# Patient Record
Sex: MALE | Race: WHITE | NOT HISPANIC OR LATINO | Employment: FULL TIME | URBAN - NONMETROPOLITAN AREA
[De-identification: names, ages, dates, MRNs, and addresses within clinical notes are randomized per-mention and may not be internally consistent; named-entity substitution may affect disease eponyms.]

---

## 2020-05-08 ENCOUNTER — OFFICE VISIT CONVERTED (OUTPATIENT)
Dept: FAMILY MEDICINE CLINIC | Age: 25
End: 2020-05-08
Attending: NURSE PRACTITIONER

## 2020-09-08 ENCOUNTER — OFFICE VISIT CONVERTED (OUTPATIENT)
Dept: FAMILY MEDICINE CLINIC | Age: 25
End: 2020-09-08
Attending: FAMILY MEDICINE

## 2021-01-19 ENCOUNTER — OFFICE VISIT CONVERTED (OUTPATIENT)
Dept: FAMILY MEDICINE CLINIC | Age: 26
End: 2021-01-19
Attending: NURSE PRACTITIONER

## 2021-01-20 ENCOUNTER — HOSPITAL ENCOUNTER (OUTPATIENT)
Dept: OTHER | Facility: HOSPITAL | Age: 26
Discharge: HOME | End: 2021-01-20
Attending: NURSE PRACTITIONER

## 2021-01-20 LAB
ALBUMIN SERPL-MCNC: 4.6 G/DL (ref 3.5–5)
ALBUMIN/GLOB SERPL: 1.3 {RATIO} (ref 1.4–2.6)
ALP SERPL-CCNC: 60 U/L (ref 53–128)
ALT SERPL-CCNC: 9 U/L (ref 10–40)
ANION GAP SERPL CALC-SCNC: 12 MMOL/L (ref 8–19)
AST SERPL-CCNC: 15 U/L (ref 15–50)
BASOPHILS # BLD MANUAL: 0.01 10*3/UL (ref 0–0.2)
BASOPHILS NFR BLD MANUAL: 0.2 % (ref 0–3)
BILIRUB SERPL-MCNC: 0.48 MG/DL (ref 0.2–1.3)
BUN SERPL-MCNC: 12 MG/DL (ref 5–25)
BUN/CREAT SERPL: 13 {RATIO} (ref 6–20)
CALCIUM SERPL-MCNC: 10.2 MG/DL (ref 8.7–10.4)
CHLORIDE SERPL-SCNC: 102 MMOL/L (ref 99–111)
CHOLEST SERPL-MCNC: 187 MG/DL (ref 107–200)
CHOLEST/HDLC SERPL: 3.5 {RATIO} (ref 3–6)
CONV CO2: 29 MMOL/L (ref 22–32)
CONV TOTAL PROTEIN: 8.1 G/DL (ref 6.3–8.2)
CREAT UR-MCNC: 0.92 MG/DL (ref 0.7–1.2)
DEPRECATED RDW RBC AUTO: 41.9 FL
EOSINOPHIL # BLD MANUAL: 0.09 10*3/UL (ref 0–0.7)
EOSINOPHIL NFR BLD MANUAL: 1.5 % (ref 0–7)
ERYTHROCYTE [DISTWIDTH] IN BLOOD BY AUTOMATED COUNT: 11.9 % (ref 11.5–14.5)
GFR SERPLBLD BASED ON 1.73 SQ M-ARVRAT: >60 ML/MIN/{1.73_M2}
GLOBULIN UR ELPH-MCNC: 3.5 G/DL (ref 2–3.5)
GLUCOSE SERPL-MCNC: 69 MG/DL (ref 70–99)
GRANS (ABSOLUTE): 4.02 10*3/UL (ref 2–8)
GRANS: 65.5 % (ref 30–85)
HBA1C MFR BLD: 15 G/DL (ref 14–18)
HCT VFR BLD AUTO: 43.9 % (ref 42–52)
HDLC SERPL-MCNC: 54 MG/DL (ref 40–60)
IMM GRANULOCYTES # BLD: 0.02 10*3/UL (ref 0–0.54)
IMM GRANULOCYTES NFR BLD: 0.3 % (ref 0–0.43)
LDLC SERPL CALC-MCNC: 114 MG/DL (ref 70–100)
LYMPHOCYTES # BLD MANUAL: 1.55 10*3/UL (ref 1–5)
LYMPHOCYTES NFR BLD MANUAL: 7.2 % (ref 3–10)
MCH RBC QN AUTO: 32.4 PG (ref 27–31)
MCHC RBC AUTO-ENTMCNC: 34.2 G/DL (ref 33–37)
MCV RBC AUTO: 94.8 FL (ref 80–96)
MONOCYTES # BLD AUTO: 0.44 10*3/UL (ref 0.2–1.2)
OSMOLALITY SERPL CALC.SUM OF ELEC: 284 MOSM/KG (ref 273–304)
PLATELET # BLD AUTO: 183 10*3/UL (ref 130–400)
PMV BLD AUTO: 10.1 FL (ref 7.4–10.4)
POTASSIUM SERPL-SCNC: 4.6 MMOL/L (ref 3.5–5.3)
RBC # BLD AUTO: 4.63 10*6/UL (ref 4.7–6.1)
SODIUM SERPL-SCNC: 138 MMOL/L (ref 135–147)
TRIGL SERPL-MCNC: 94 MG/DL (ref 40–150)
TSH SERPL-ACNC: 1.1 M[IU]/L (ref 0.27–4.2)
VARIANT LYMPHS NFR BLD MANUAL: 25.3 % (ref 20–45)
VLDLC SERPL-MCNC: 19 MG/DL (ref 5–37)
WBC # BLD AUTO: 6.13 10*3/UL (ref 4.8–10.8)

## 2021-05-18 NOTE — PROGRESS NOTES
Shaheen Raygoza  1995     Office/Outpatient Visit    Visit Date: Fri, May 8, 2020 10:59 am    Provider: Xochitl Staples N.P. (Assistant: Griselda Vargas MA)    Location: [facility]        Electronically signed by Xochitl Staples N.P. on  05/08/2020 01:17:40 PM                             Subjective:        CC: Salvador is a 25 year old White male.  New patient establishment, complaints of nausea and diarrhea X 2 weeks ([employer] VIDEO CALL )         HPI: 25 year old male presenting to clinic to establish care and with complaints of heartburn, nausea and diarrhea x 2 weeks. He states he started to take Nexium approx 2-3 weeks ago and it has helped somewhat. He wakes nauseated and has diarrhea. He usually has 4-5 loose stools which subside by mid afternoon. He states that it usually occurs after eating. No abd pain, just mild cramping. Pt does not eat before going to bed. Medical, surgical, family and social hx reviewed with pt.    ROS:     CONSTITUTIONAL:  Negative for chills, fatigue and fever.      CARDIOVASCULAR:  Negative for chest pain and palpitations.      RESPIRATORY:  Negative for recent cough and dyspnea.      GASTROINTESTINAL:  Negative for abdominal bloating, constipation, hematochezia, melena and vomiting.      GENITOURINARY:  Negative for dysuria.      MUSCULOSKELETAL:  Negative for myalgias.      INTEGUMENTARY:  Negative for pruritis and rash.      NEUROLOGICAL:  Negative for dizziness, fainting, headaches, paresthesias and weakness.      ENDOCRINE:  Negative for polydipsia and polyphagia.      PSYCHIATRIC:  Negative for anxiety and sleep disturbance.          Past Medical History / Family History / Social History:         Last Reviewed on 5/08/2020 01:09 PM by Xochitl Staples    Past Medical History:             PAST MEDICAL HISTORY         HEART MURMUR     Chicken pox     WEARS CONTACTS         Surgical History:         Tonsillectomy/Adenoidectomy; at age 4yr       broken femur - has joel;         Family History:     Father: ;  Myocardial Infarction ( AT AGE 35 )     Paternal Grandfather: Brain Tumor     Paternal Grandmother: Cancer (unspecified type)         Social History:         Household:  Lives with his mother.  Occupation: [employer] ([employer])     Marital Status: Single     Children: None         Tobacco/Alcohol/Supplements:     Last Reviewed on 2020 01:09 PM by Xochitl Staples    Tobacco: He has never smoked.          Alcohol: Frequency: Just on weekends         Substance Abuse History:     Last Reviewed on 2020 01:09 PM by Xochitl Staples    None         Mental Health History:     Last Reviewed on 2020 01:09 PM by Xochitl Staples    NEGATIVE         Immunizations:     DTaP 1994    DTaP 1994    DTaP 1994    DTaP 1995    DTaP 1998    Hib HbOC (4-dose) 1994    Hib HbOC (4-dose) 1994    Hib HbOC (4-dose) 1994    Hib HbOC (4-dose) 1995    Hep B (pedi/adol, 3-dose schedule) 1994    Hep B (pedi/adol, 3-dose schedule) 1994    Hep B (pedi/adol, 3-dose schedule) 1995    OPV  Poliovirus, live (oral) 1994    OPV  Poliovirus, live (oral) 8/15/1996    OPV  Poliovirus, live (oral) 1994    OPV  Poliovirus, live (oral) 1998    MMR  (Measles-Mumps-Rubella), live 1998    MMR  (Measles-Mumps-Rubella), live 1995    Varicella, live 1998    Prevnar (Pneumococcal PCV 7) 2000    FluMist 2006    FluMist 2008    FluMist 2009    FluMist 2010    FluMist 2011    Influenza A (H1N1) NASAL, Monovalent Live 11/3/2009    Influenza, live, intranasal 2007    Menactra (Meningococcal MCV4P) 2008    Tdap (Tetanus, reduced diph, acellular pertussis) 2005        Allergies:     Last Reviewed on 2020 01:09 PM by Xochitl Staples    Phenergan:          Current Medications:     Last Reviewed on 2020 01:09 PM by Xochitl Staples    None         Objective:        Exams:     PHYSICAL EXAM:     GENERAL: well developed, well nourished;  well groomed;  no apparent distress;     RESPIRATORY: normal respiratory rate and pattern with no distress;     MUSCULOSKELETAL: normal overall tone     NEUROLOGIC: mental status: alert and oriented x 3;     PSYCHIATRIC:  appropriate affect and demeanor; normal speech pattern; grossly normal memory;         Assessment:         K21.9   Gastro-esophageal reflux disease without esophagitis       R19.7   Diarrhea, unspecified           ORDERS:         Meds Prescribed:       [New Rx] pantoprazole 40 mg oral tablet, delayed release (enteric coated) [take 1 tablet (40 mg) by oral route daily], #30 (thirty) tablets, Refills: 1 (one)       [New Rx] sucralfate 1 gram oral tablet [take 1 tablet (1 gram) by oral route 4 times per day (30 - 60 minutes prior to meals and at bed time) ], #56 (fifty six) tablets, Refills: 0 (zero)       [New Rx] ondansetron 4 mg oral Tablet,disintegrating [place  1  tablets (4 mg) on top of the tongue where it will dissolve every 6 hours as needed for nausea], #30 (thirty) tablets, Refills: 0 (zero)                 Plan:         Gastro-esophageal reflux disease without esophagitisPt is to stop taking Nexium and start protonix. Zofran as needed for nausea. Lane diet for next 48 hours and advance as tolerated. Carafate as prescribed for 2 weeks. If symptoms worsen, notify office or if they are not started to subside within 1-2 weeks. At this point, labs and imaging may be needed. Pt v/u and had no further questions upon d/c,      Telehealth: Verbal consent obtained for visit to occur via televideo conferencing; Staff, other than provider, present during telephone visit include Griselda Vargas MA           Prescriptions:       [New Rx] pantoprazole 40 mg oral tablet, delayed release (enteric coated) [take 1 tablet (40 mg) by oral route daily], #30 (thirty) tablets, Refills: 1 (one)       [New Rx] ondansetron 4 mg  oral Tablet,disintegrating [place  1  tablets (4 mg) on top of the tongue where it will dissolve every 6 hours as needed for nausea], #30 (thirty) tablets, Refills: 0 (zero)       [New Rx] sucralfate 1 gram oral tablet [take 1 tablet (1 gram) by oral route 4 times per day (30 - 60 minutes prior to meals and at bed time) ], #56 (fifty six) tablets, Refills: 0 (zero)         Diarrhea, unspecifiedsee above. Hopefully decreasing acid will help with diarrhea. If not, will follow plan above.            Charge Capture:         Primary Diagnosis:     K21.9  Gastro-esophageal reflux disease without esophagitis           Orders:      [id number]  Office visit - new pt, level 3  (In-House)              R19.7  Diarrhea, unspecified

## 2021-05-18 NOTE — PROGRESS NOTES
Shaheen Raygoza  1995     Office/Outpatient Visit    Visit Date: Tue, Sep 8, 2020 03:00 pm    Provider: Marty Gupta MD (Assistant: Enedina Le, )    Location: [facility]        Electronically signed by Marty Gupta MD on  09/08/2020 05:13:31 PM                             Subjective:        CC: Salvador is a 25 year old male.  Physical         HPI:           Encounter for general adult medical examination without abnormal findings noted.  His last physical exam was 1 year ago.  He does not perform regular testicular self-exams.  He is not current with his Td and influenza immunization.      Smoking Status:  Nonsmoker     ROS:     CONSTITUTIONAL:  Negative for chills, fatigue, fever, and weight change.      EYES:  Negative for blurred vision.      E/N/T:  Negative for ear pain and tinnitus.      CARDIOVASCULAR:  Negative for chest pain, dizziness, palpitations and edema.      RESPIRATORY:  Negative for dyspnea and cough.      GASTROINTESTINAL:  Negative for abdominal pain, constipation, diarrhea, heartburn, nausea and vomiting.      GENITOURINARY:  Negative for dysuria, hematuria and polyuria.      MUSCULOSKELETAL:  Negative for arthralgias and myalgias.      INTEGUMENTARY:  Negative for rash.      NEUROLOGICAL:  Negative for headaches, paresthesias and weakness.      HEMATOLOGIC/LYMPHATIC:  Negative for easy bruising and excessive bleeding.      ENDOCRINE:  Negative for hair loss, heat/cold intolerance, polydipsia, and polyphagia.      ALLERGIC/IMMUNOLOGIC:  Positive for seasonal allergies.      PSYCHIATRIC:  Negative for anxiety, depression, and sleep disturbances.          Past Medical History / Family History / Social History:         Last Reviewed on 9/08/2020 05:13 PM by Marty Gupta    Past Medical History:             PAST MEDICAL HISTORY         HEART MURMUR     Chicken pox     WEARS CONTACTS         PAST MEDICAL HISTORY         Positive for    Gastroesophageal Reflux  Disease;         Surgical History:         Tonsillectomy/Adenoidectomy; at age 4yr     2008 broken femur - has joel;         Family History:     Father: ;  Myocardial Infarction ( AT AGE 35 )     Paternal Grandfather: Brain Tumor     Paternal Grandmother: Cancer (unspecified type)         Social History:         Household:  Lives with his mother.  Occupation: [employer] ([employer])     Marital Status: Single     Children: None         Tobacco/Alcohol/Supplements:     Last Reviewed on 2020 05:13 PM by Marty Gupta    Tobacco: He has never smoked.          Alcohol: Frequency: Just on weekends         Substance Abuse History:     Last Reviewed on 2020 05:13 PM by Marty Gupta    None         Mental Health History:     Last Reviewed on 2020 05:13 PM by Marty Gupta    NEGATIVE         Communicable Diseases (eg STDs):     Last Reviewed on 2020 05:13 PM by Marty Gupta        Current Problems:     Last Reviewed on 2020 05:13 PM by Marty Gupta    Gastro-esophageal reflux disease without esophagitis    Encounter for general adult medical examination without abnormal findings        Immunizations:     DTaP 1994    DTaP 1994    DTaP 1994    DTaP 1995    DTaP 1998    Hib HbOC (4-dose) 1994    Hib HbOC (4-dose) 1994    Hib HbOC (4-dose) 1994    Hib HbOC (4-dose) 1995    Hep B (pedi/adol, 3-dose schedule) 1994    Hep B (pedi/adol, 3-dose schedule) 1994    Hep B (pedi/adol, 3-dose schedule) 1995    OPV  Poliovirus, live (oral) 1994    OPV  Poliovirus, live (oral) 8/15/1996    OPV  Poliovirus, live (oral) 1994    OPV  Poliovirus, live (oral) 1998    MMR  (Measles-Mumps-Rubella), live 1998    MMR  (Measles-Mumps-Rubella), live 1995    Varicella, live 1998    Prevnar (Pneumococcal PCV 7) 2000    FluMist 2006    FluMist 2008    FluMist 2009    FluMist 2010    FluMist 2011    Influenza A  "(H1N1) NASAL, Monovalent Live 11/3/2009    Influenza, live, intranasal 11/7/2007    Menactra (Meningococcal MCV4P) 6/17/2008    Tdap (Tetanus, reduced diph, acellular pertussis) 6/22/2005        Allergies:     Last Reviewed on 9/08/2020 05:13 PM by Marty Gupta    Phenergan:          Current Medications:     Last Reviewed on 9/08/2020 05:13 PM by Marty Gupta    None        Objective:        Vitals:         Current: 9/8/2020 3:05:04 PM    Ht:  6 ft, 1.625 in;  Wt: 157.9 lbs;  BMI: 20.5T: 97.6 F (temporal);  BP: 133/68 mm Hg (left arm, sitting);  P: 78 bpm (left arm (BP Cuff), sitting)        Exams:     PHYSICAL EXAM:     GENERAL: Vitals recorded well developed, well nourished;  no apparent distress;     EYES: conjunctiva and cornea are normal;     NECK: trachea is midline; thyroid is non-palpable;     RESPIRATORY: Clear to auscultation bilateally; no rales (\"crackles\") present; no rhonchi; no wheezes;     CARDIOVASCULAR: normal rate; rhythm is regular;  No murmurs, clicks, gallops or rubs appreciated; no edema;     GASTROINTESTINAL: nontender; Soft and nondistended; normal bowel sounds; no organomegaly; no masses;     LYMPHATIC: no enlargement of cervical or facial nodes; no supraclavicular nodes;     SKIN:  No significant rashes, lesions or suspicious moles within limits of examination;     MUSCULOSKELETAL: muscle strength: 5/5 in all major muscle groups;  normal overall tone     NEUROLOGIC: mental status: alert and oriented x 3; Grossly intact;     PSYCHIATRIC: appropriate affect and demeanor; normal speech pattern; Normal behavior;         Assessment:         Z00.00   Encounter for general adult medical examination without abnormal findings           ORDERS:         Lab Orders:       [id number]  [facility] PHYSICAL: CMP, CBC, TSH, LIPID: [id number], [id number], [id number], [id number]  (Send-Out)                      Plan:         Encounter for general adult medical examination without abnormal findings- Appropriate screenings and " vaccinations were reviewed with the patient and offered as indicated.  Patient counseled on healthy lifestyle including balanced diet and adequate physical activity.  Screening labs ordered today including CBC, CMP, TSH and lipid panel.    LABORATORY:  Labs ordered to be performed today include PHYSICAL PANEL; CMP, CBC, TSH, LIPID.            Orders:       [id number]  [facility] PHYSICAL: CMP, CBC, TSH, LIPID: [id number], [id number], [id number], [id number]  (Send-Out)                  Charge Capture:         Primary Diagnosis:     Z00.00  Encounter for general adult medical examination without abnormal findings           Orders:      [id number]  Preventive medicine, established patient, age 18-39 years  (In-House)

## 2021-05-18 NOTE — PROGRESS NOTES
Shaheen Raygoza  1995     Office/Outpatient Visit    Visit Date:  03:25 pm    Provider: Xochitl Staples N.P. (Assistant: Elidia Guzman MA)    Location: [facility]        Electronically signed by Xochitl Staples N.P. on  2021 11:22:06 PM                             Subjective:        CC: [employer] [phone] is a 25 year old White male.  Light headed and dizziness at work the other day. Fam hx. of heart disease, would like a referral to cardio         HPI: 24 y/o male presenting to office via telehealth video c/o feeling light headed and dizzy at work late last week. He has had a couple of episodes since. Can happen at anytime, at rest or with activity. Pt has murmur and sees cardio regularly. Has an appt next month, but wanted to get checked out to see if he needed to be seen earlier.    ROS:     CONSTITUTIONAL:  Negative for chills, fatigue and fever.      EYES:  Negative for blurred vision.      CARDIOVASCULAR:  Positive for palpitations.   Negative for chest pain, dizziness, pedal edema or tachycardia.      RESPIRATORY:  Negative for recent cough and dyspnea.      GASTROINTESTINAL:  Negative for abdominal pain, diarrhea and nausea.      MUSCULOSKELETAL:  Negative for myalgias.      INTEGUMENTARY:  Negative for rash.      NEUROLOGICAL:  Negative for headaches, paresthesias and weakness.      PSYCHIATRIC:  Negative for anxiety, depression and sleep disturbance.          Past Medical History / Family History / Social History:         Last Reviewed on 2021 04:13 PM by Xochitl Staples    Past Medical History:             PAST MEDICAL HISTORY         HEART MURMUR     Chicken pox     WEARS CONTACTS         PAST MEDICAL HISTORY         Positive for    Gastroesophageal Reflux Disease;         Surgical History:         Tonsillectomy/Adenoidectomy; at age 4yr      broken femur - has joel;         Family History:     Father: ;   Myocardial Infarction ( AT AGE 35 )     Paternal Grandfather: Brain Tumor     Paternal Grandmother: Cancer (unspecified type)         Social History:         Household:  Lives with his mother.  Occupation: [employer] ([employer])     Marital Status: Single     Children: None         Tobacco/Alcohol/Supplements:     Last Reviewed on 1/19/2021 04:13 PM by Xochitl Staples    Tobacco: He has never smoked.          Alcohol: Frequency: Just on weekends         Substance Abuse History:     Last Reviewed on 1/19/2021 04:13 PM by Xochitl Staples    None         Mental Health History:     Last Reviewed on 1/19/2021 04:13 PM by Xochitl Staples    NEGATIVE         Communicable Diseases (eg STDs):     Last Reviewed on 1/19/2021 04:13 PM by Xochitl Staples        Immunizations:     DTaP 1994    DTaP 1994    DTaP 1994    DTaP 5/18/1995    DTaP 4/28/1998    Hib HbOC (4-dose) 1994    Hib HbOC (4-dose) 1994    Hib HbOC (4-dose) 1994    Hib HbOC (4-dose) 5/18/1995    Hep B (pedi/adol, 3-dose schedule) 1994    Hep B (pedi/adol, 3-dose schedule) 1994    Hep B (pedi/adol, 3-dose schedule) 2/17/1995    OPV  Poliovirus, live (oral) 1994    OPV  Poliovirus, live (oral) 8/15/1996    OPV  Poliovirus, live (oral) 1994    OPV  Poliovirus, live (oral) 4/28/1998    MMR  (Measles-Mumps-Rubella), live 4/28/1998    MMR  (Measles-Mumps-Rubella), live 5/18/1995    Varicella, live 4/28/1998    Prevnar (Pneumococcal PCV 7) 6/16/2000    FluMist 12/7/2006    FluMist 9/29/2008    FluMist 8/20/2009    FluMist 9/22/2010    FluMist 9/6/2011    Influenza A (H1N1) NASAL, Monovalent Live 11/3/2009    Influenza, live, intranasal 11/7/2007    Menactra (Meningococcal MCV4P) 6/17/2008    Tdap (Tetanus, reduced diph, acellular pertussis) 6/22/2005        Allergies:     Last Reviewed on 1/19/2021 04:13 PM by Xochitl Staples    Phenergan:          Current Medications:     Last Reviewed on 1/19/2021 04:13 PM by Bel  Xochitl MONSALVE    None        Objective:        Exams:     PHYSICAL EXAM:     GENERAL: well developed, well nourished;  well groomed;  no apparent distress;     RESPIRATORY: normal respiratory rate and pattern with no distress;     NEUROLOGIC: mental status: alert and oriented x 3;     PSYCHIATRIC:  appropriate affect and demeanor; normal speech pattern; grossly normal memory;         Assessment:         R42   Dizziness and giddiness           ORDERS:         Radiology/Test Orders:       [id number]  Electrocardiogram, routine with at least 12 leads; with interpretation and report  (Send-Out)              Lab Orders:       [id number]  [facility] PHYSICAL: CMP, CBC, TSH, LIPID: [id number], [id number]  (Send-Out)                      Plan:         Dizziness and giddinessPt to come in tomorrow to have ECG and check BP. Go to ED immediately with any chest pain or syncope/feeling faint or with an increase in dizziness.      LABORATORY:  Labs ordered to be performed today include PHYSICAL PANEL; CMP, CBC, TSH, LIPID.      TESTS/PROCEDURES:  Will proceed with an ECG to be performed/scheduled now.  Telehealth: Verbal consent obtained for visit to occur via televideo conferencing; Staff, other than provider, present during telephone visit include JUSTIN PULIDO           Orders:       [id number]  [facility] PHYSICAL: CMP, CBC, TSH, LIPID: [id number], [id number], [id number], [id number]  (Send-Out)            [id number]  Electrocardiogram, routine with at least 12 leads; with interpretation and report  (Send-Out)                  Charge Capture:         Primary Diagnosis:     R42  Dizziness and giddiness           Orders:      [id number]  Office/outpatient visit; established patient, level 3  (In-House)                  ADDENDUMS:      ____________________________________    Addendum: 02/09/2021 07:16 AM - Xochitl Staples        remove neg dizziness from ROS

## 2021-07-02 VITALS
DIASTOLIC BLOOD PRESSURE: 68 MMHG | TEMPERATURE: 97.6 F | HEART RATE: 78 BPM | BODY MASS INDEX: 20.26 KG/M2 | HEIGHT: 74 IN | SYSTOLIC BLOOD PRESSURE: 133 MMHG | WEIGHT: 157.9 LBS

## 2021-09-21 ENCOUNTER — OFFICE VISIT (OUTPATIENT)
Dept: FAMILY MEDICINE CLINIC | Age: 26
End: 2021-09-21

## 2021-09-21 VITALS
HEIGHT: 74 IN | SYSTOLIC BLOOD PRESSURE: 103 MMHG | WEIGHT: 161 LBS | HEART RATE: 58 BPM | BODY MASS INDEX: 20.66 KG/M2 | TEMPERATURE: 98.6 F | DIASTOLIC BLOOD PRESSURE: 57 MMHG

## 2021-09-21 DIAGNOSIS — Z00.00 ROUTINE GENERAL MEDICAL EXAMINATION AT A HEALTH CARE FACILITY: Primary | ICD-10-CM

## 2021-09-21 PROBLEM — R01.1 MURMUR: Status: ACTIVE | Noted: 2020-10-21

## 2021-09-21 PROCEDURE — 90715 TDAP VACCINE 7 YRS/> IM: CPT | Performed by: NURSE PRACTITIONER

## 2021-09-21 PROCEDURE — 99395 PREV VISIT EST AGE 18-39: CPT | Performed by: NURSE PRACTITIONER

## 2021-09-21 PROCEDURE — 90471 IMMUNIZATION ADMIN: CPT | Performed by: NURSE PRACTITIONER

## 2021-09-21 NOTE — ASSESSMENT & PLAN NOTE
Patient is not indicated for labs at this time.  He has agreed to get Tdap today.  He is contemplating Covid vaccination and he believes that work is getting ready to mandate them.  Patient is otherwise up-to-date on preventative measures.  No acute concerns or issues  at this time.  Patient to notify office with any acute concerns or issues.  Patient verbalizes understanding, agrees with plan of care and has no further questions upon discharge.    Please note that portions of this note were completed with a voice recognition program.

## 2021-09-21 NOTE — PROGRESS NOTES
"Chief Complaint  Annual Exam    Subjective          Shaheen Raygoza presents to [facility] for annual exam for work.  Patient has not had any acute concerns or issues at this time.  Patient had labs drawn in January 2021.  Labs are not indicated at this time.  Patient is due for his tetanus vaccine.  He is contemplating Covid vaccine and believes work is going to mandate them soon.  He has plans on getting vaccine at [place].  Patient has heart murmur and has routine visits with cardiologist.  Medical, surgical, family and social histories reviewed and updated in chart.    Review of Systems   Constitutional: Negative for fatigue and fever.   HENT: Negative for hearing loss.    Eyes: Negative for blurred vision.   Respiratory: Negative for cough and shortness of breath.    Cardiovascular: Negative for chest pain, palpitations and leg swelling.   Gastrointestinal: Negative for abdominal pain, constipation, diarrhea, nausea and vomiting.   Genitourinary: Negative for dysuria and urgency.   Musculoskeletal: Negative for myalgias.   Skin: Negative for color change and rash.   Neurological: Negative for dizziness, syncope, weakness and headache.   Psychiatric/Behavioral: Negative for sleep disturbance, suicidal ideas and depressed mood. The patient is not nervous/anxious.        Objective   Vital Signs:   /57 (BP Location: Right arm, Patient Position: Sitting)   Pulse 58   Temp 98.6 °F (37 °C) (Oral)   Ht 187.7 cm (73.9\")   Wt 73 kg (161 lb)   BMI 20.73 kg/m²     Physical Exam  Vitals reviewed.   Constitutional:       General: He is not in acute distress.     Appearance: Normal appearance. He is normal weight. He is not diaphoretic.   HENT:      Head: Normocephalic and atraumatic. Hair is normal.      Right Ear: Hearing and external ear normal.      Left Ear: Hearing and external ear normal.      Nose: No nasal deformity.      Mouth/Throat:      Mouth: No oral lesions.      " Pharynx: Uvula midline. No uvula swelling.   Eyes:      General: Lids are normal. No scleral icterus.        Right eye: No discharge.         Left eye: No discharge.      Extraocular Movements: Extraocular movements intact.      Right eye: Normal extraocular motion and no nystagmus.      Left eye: Normal extraocular motion and no nystagmus.      Conjunctiva/sclera: Conjunctivae normal.      Pupils: Pupils are equal, round, and reactive to light.   Neck:      Thyroid: No thyromegaly.      Vascular: No JVD.   Cardiovascular:      Rate and Rhythm: Normal rate and regular rhythm.      Pulses: Normal pulses.      Heart sounds: Normal heart sounds. No murmur heard.   No gallop.    Pulmonary:      Effort: Pulmonary effort is normal. No respiratory distress.      Breath sounds: Normal breath sounds. No wheezing or rales.   Chest:      Chest wall: No tenderness.   Abdominal:      General: Bowel sounds are normal. There is no distension.      Palpations: Abdomen is soft. There is no mass.      Tenderness: There is no abdominal tenderness. There is no guarding.      Hernia: No hernia is present.   Musculoskeletal:         General: No tenderness or deformity. Normal range of motion.      Cervical back: Normal range of motion and neck supple.   Lymphadenopathy:      Cervical: No cervical adenopathy.   Skin:     General: Skin is warm and dry.      Findings: No rash.   Neurological:      Mental Status: He is alert and oriented to person, place, and time.      Cranial Nerves: No cranial nerve deficit.      Coordination: Coordination normal.      Deep Tendon Reflexes: Reflexes are normal and symmetric.   Psychiatric:         Mood and Affect: Mood normal.         Behavior: Behavior normal.         Thought Content: Thought content normal.         Judgment: Judgment normal.          Result Review :              Assessment and Plan    Diagnoses and all orders for this visit:    1. Routine general medical examination at a [facility]  facility (Primary)  Assessment & Plan:  Patient is not indicated for labs at this time.  He has agreed to get Tdap today.  He is contemplating Covid vaccination and he believes that work is getting ready to mandate them.  Patient is otherwise up-to-date on preventative measures.  No acute concerns or issues  at this time.  Patient to notify office with any acute concerns or issues.  Patient verbalizes understanding, agrees with plan of care and has no further questions upon discharge.    Please note that portions of this note were completed with a voice recognition program.      Other orders  -     Tdap Vaccine Greater Than or Equal To 6yo IM      Follow Up    Return in about 1 year (around 9/21/2022) for Annual physical.  Patient was given instructions and counseling regarding his condition or for health maintenance advice. Please see specific information pulled into the AVS if appropriate.

## 2023-06-13 ENCOUNTER — TELEMEDICINE (OUTPATIENT)
Dept: FAMILY MEDICINE CLINIC | Age: 28
End: 2023-06-13
Payer: COMMERCIAL

## 2023-06-13 DIAGNOSIS — F43.21 GRIEF: Primary | ICD-10-CM

## 2023-06-13 PROCEDURE — 99213 OFFICE O/P EST LOW 20 MIN: CPT | Performed by: NURSE PRACTITIONER

## 2023-06-13 NOTE — LETTER
June 13, 2023     Patient: Shaheen Raygoza   YOB: 1995   Date of Visit: 6/13/2023       To Whom It May Concern:    It is my medical opinion that Shaheen Raygoza may return to work Saturday 6/17/2023.            Sincerely,        BENNIE Velazco    CC: No Recipients

## 2023-06-13 NOTE — PROGRESS NOTES
"Chief Complaint  Depression (Pt states that last night he lost his best friend and he is requesting a leave from work ), Stress, and Anxiety    Subjective        Shaheen Raygoza presents to [facility]  Depression  Visit Type: initial  Onset of symptoms: in the past 7 days  Progression since onset: unchanged  Patient presents with the following symptoms: depressed mood.  Patient is not experiencing: suicidal planning.        Patient consent to video visit.  Patient identity confirmed.  [employer] video call was used.  Provider in office at desktop  Patient at home.    Objective   Vital Signs:  There were no vitals taken for this visit.  Estimated body mass index is 20.73 kg/m² as calculated from the following:    Height as of 9/21/21: 187.7 cm (73.9\").    Weight as of 9/21/21: 73 kg (161 lb).       BMI is within normal parameters. No other follow-up for BMI required.      Physical Exam  Pulmonary:      Effort: No respiratory distress.   Neurological:      Mental Status: He is alert and oriented to person, place, and time.   Psychiatric:         Mood and Affect: Affect is tearful.      Result Review :                   Assessment and Plan   Diagnoses and all orders for this visit:    1. Grief (Primary)  Comments:  Discussed coping skills, has good support, off work for rest, F/U as needed             Follow Up   Return if symptoms worsen or fail to improve.  Patient was given instructions and counseling regarding his condition or for health maintenance advice. Please see specific information pulled into the AVS if appropriate.         "